# Patient Record
Sex: FEMALE | NOT HISPANIC OR LATINO | Employment: UNEMPLOYED | URBAN - METROPOLITAN AREA
[De-identification: names, ages, dates, MRNs, and addresses within clinical notes are randomized per-mention and may not be internally consistent; named-entity substitution may affect disease eponyms.]

---

## 2017-03-22 ENCOUNTER — TRANSCRIBE ORDERS (OUTPATIENT)
Dept: PHYSICAL THERAPY | Facility: HOSPITAL | Age: 47
End: 2017-03-22

## 2017-03-22 DIAGNOSIS — S81.802D OPEN WOUND OF LEFT LOWER EXTREMITY, SUBSEQUENT ENCOUNTER: Primary | ICD-10-CM

## 2017-03-29 ENCOUNTER — HOSPITAL ENCOUNTER (OUTPATIENT)
Dept: PHYSICAL THERAPY | Facility: HOSPITAL | Age: 47
Setting detail: THERAPIES SERIES
Discharge: HOME | End: 2017-03-29

## 2017-03-29 DIAGNOSIS — S81.802D TRAUMATIC OPEN WOUND OF LEFT LOWER LEG WITH DELAYED HEALING, SUBSEQUENT ENCOUNTER: Primary | ICD-10-CM

## 2017-03-29 PROCEDURE — 87070 CULTURE OTHR SPECIMN AEROBIC: CPT | Performed by: ORTHOPAEDIC SURGERY

## 2017-03-29 PROCEDURE — 87077 CULTURE AEROBIC IDENTIFY: CPT | Performed by: ORTHOPAEDIC SURGERY

## 2017-03-29 PROCEDURE — G8990 OTHER PT/OT CURRENT STATUS: HCPCS

## 2017-03-29 PROCEDURE — 87075 CULTR BACTERIA EXCEPT BLOOD: CPT | Performed by: ORTHOPAEDIC SURGERY

## 2017-03-29 PROCEDURE — 87205 SMEAR GRAM STAIN: CPT | Performed by: ORTHOPAEDIC SURGERY

## 2017-03-29 PROCEDURE — 87186 SC STD MICRODIL/AGAR DIL: CPT | Performed by: ORTHOPAEDIC SURGERY

## 2017-03-29 PROCEDURE — G8991 OTHER PT/OT GOAL STATUS: HCPCS

## 2017-03-29 PROCEDURE — 97162 PT EVAL MOD COMPLEX 30 MIN: CPT

## 2017-03-29 PROCEDURE — 97610 LOW FREQUENCY NON-THERMAL US: CPT

## 2017-03-29 PROCEDURE — 97597 DBRDMT OPN WND 1ST 20 CM/<: CPT

## 2017-03-29 NOTE — PROGRESS NOTES
Outpatient Rehabilitation - Wound/Debridement Initial Eval  [facility]     Patient Name: Sofi Paez  : 1970  MRN: [id number]  Today's Date: 3/29/2017            LLE wound:      Admit Date: 3/29/2017    Visit Dx:    ICD-10-CM ICD-9-CM   1. Traumatic open wound of left lower leg with delayed healing, subsequent encounter S81.802D V58.89     891.1       There is no problem list on file for this patient.       Past Medical History:   Diagnosis Date   • COPD (chronic obstructive pulmonary disease)    • High cholesterol    • Rheumatoid arthritis         Past Surgical History:   Procedure Laterality Date   • CHOLECYSTECTOMY     • HYSTERECTOMY     • SKIN GRAFT SPLIT THICKNESS Left 2017    Split-thickness grafting to LLE from L thigh; Ricardo John MD ([facility])   • WOUND DEBRIDEMENT Left 2017    exploration and debridement of leg laceration with surgical closure; per Ricardo John MD   • WOUND DEBRIDEMENT Left 2017    I&D of dehisced surgical wound and surgical closure; Ricardo John MD             Patient History       17 1030          History    Chief Complaint Ulcer, wound or other skin condition;Pain;Difficulty Walking;Difficulty with daily activities  -      Type of Pain Lower Extremity / Leg  -      Date Current Problem(s) Began 17  -      Brief Description of Current Complaint Patient fell on stairs and lacerated left shin.  Pt had surgical closure 17, I&D 17 after wound dehisced, and again 17.  Patient then had split-thickness skin graft 3/9/17 from L thigh that did not take.  Pt is now referred to PT wound care for treatment.  -      Previous treatment for THIS PROBLEM Surgery;Medication  -      Surgery Date: 17  -      Patient/Caregiver Goals Heal wound;Know what to do to help the symptoms;Return to prior level of function  -      Patient's Rating of General Health Good  -      Patient seeing anyone else  for problem(s)? No  -JM      How has patient tried to help current problem? Currently dressing wound daily with dry gauze, keeping dry in shower, elevating leg.  Not currently on antibitoics.  -      What clinical tests have you had for this problem? Other 1 (comment)   wound culture 2/3/17 in ED  -      Results of Clinical Tests (+) Klebsiella from ED culture 2/3/17, most recent cultures 2/23/17 no growth  -      Related/Recent Hospitalizations Yes  -      Date of Hospitalization 01/18/17  -      Surgery CPT Code 1 --   Exploration and debridment of LLE wound with closure  -      Surgery Date 1 01/18/17  -      Surgery CPT Code 2 --   I&D and closure of dehisced LLE wound  -      Surgery Date 2 02/23/17  -      Surgery CPT Code 3 --   Split-thickness skin graft to LLE wound, L thigh donor site  -      Surgery Date 3 03/09/17  -      Surgery/Hospitalization [facility], surgeon Ricardo John MD  -      Pain     Pain Location Leg  -      Pain at Present 5  -JM      What Performance Factors Make the Current Problem(s) WORSE? Standing, walking  -      What Performance Factors Make the Current Problem(s) BETTER? Rest, elevation of leg  -      Is your sleep disturbed? Yes  -      Difficulties with ADL's? Unable to go up/down stairs, difficulty with walking and standing, decreased strength/endurance since limiting weight bearing since January.  -      Fall Risk Assessment    Any falls in the past year: Yes  -JM      Number of falls reported in the last 12 months 2  -      Factors that contributed to the fall: Tripped  -      Daily Activities    Primary Language English  -      Are you able to read Yes  -JM      Are you able to write Yes  -      How does patient learn best? Listening;Demonstration  -      Teaching needs identified Management of Condition  -      Patient is concerned about/has problems with Climbing Stairs;Performing home management  (household chores, shopping, care of dependents);Performing job responsibilities/community activities (work, school,;Standing;Walking  -      Barriers to learning None  -JM      Action taken for identified issues Education provided to pt and family during initial evaluation and treatment.  -      Pt Participated in POC and Goals Yes  -        User Key  (r) = Recorded By, (t) = Taken By, (c) = Cosigned By    Initials Name Provider Type    ANNA Whaley, PT Physical Therapist          EVALUATION        PT Ortho       03/29/17 1030    Subjective Comments    Subjective Comments Pt states she put current dressing on last night.  Has been keeping her leg wrapped when showering but occasionally gets bandage wet.  Not using any wound , just changing to new dry gauze dressing per instructions.  Pt states she is currently not on any antibiotics but had Cipro and Bactrim in February.  -    Subjective Pain    Able to rate subjective pain? yes  -JM    Pre-Treatment Pain Level 4  -JM    Post-Treatment Pain Level 6  -JM    Transfers    Transfers, Sit-Stand [place] independent  -    Transfers, Stand-Sit [place] independent  -    Transfer, Comment Pt supine on stretcher for tx.  -    Gait Assessment/Treatment    Gait, [place] Level independent  -    Gait, Assistive Device standard walker  -    Gait, Gait Deviations left:;antalgic  -      User Key  (r) = Recorded By, (t) = Taken By, (c) = Cosigned By    Initials Name Provider Type    ANNA Whaley, PT Physical Therapist                LDA Wound       03/29/17 1030          Wound 03/29/17 1030 Left anterior leg laceration    Wound - Properties Group Date first assessed: 03/29/17  - Time first assessed: 1030  - Side: Left  - Orientation: anterior  - Location: leg  - Type: laceration  - Additional Comments: deep laceration to pre-tibial area 1/18/17  -    Wound WDL ex   min erythema/moderate edema  -      Dressing  "Appearance intact;moist drainage  -JM      Base moist;pink;granulating;maroon;purple;reddened;yellow;slough   fascia with a few sutures visible  -      Periwound Area swelling;intact;pink;other (see comments)   hyperpigmented/brawny, thickened edges  -JM      Edges open;rolled   edge attached 4-6:00, loose around remainder of wound  -JM      Length (cm) 6.5  -JM      Width (cm) 5.5  -JM      Depth (cm) 1  -JM      Undermining [depth (cm)/Location] 2cm/11:00-2:00  -JM      Drainage Characteristics/Odor serosanguineous;no odor;specimen obtained and sent to lab  -      Drainage Amount moderate  -JM      Picture taken yes  -      Wound Cleaning cleansed with;other (see comments)   Phase one wound cleanser  -      Wound Interventions debrided;other (see comments)   MIST 12 minutes  -      Dressing Dressing applied;low-adherent   6\" optifoam gentle, size 5 compressogrip  -      Packing other (see comments)   aquacel ag ribbon  -      Periwound Care cleansed with pH balanced cleanser;dry periwound area maintained  -        User Key  (r) = Recorded By, (t) = Taken By, (c) = Cosigned By    Initials Name Provider Type    ANNA Whaley, PT Physical Therapist            WOUND DEBRIDEMENT  Debridement Site 1  Location- Site 1: LLE wound  Selective Debridement- Site 1: Wound Surface <20cmsq (approx 6cm area debrided)  Instruments- Site 1: tweezers  Excised Tissue Description- Site 1: minimum, slough  Bleeding- Site 1: none                 Recommendation and Plan        PT Assessment/Plan       03/29/17 1030       PT Assessment    Functional Limitations Impaired gait;Limitation in home management;Limitations in community activities;Performance in leisure activities;Performance in self-care ADL  -     Impairments Integumentary integrity;Edema;Pain  -     Assessment Comments Pt with chronic non-healing wound of LLE s/p laceration with multiple I&Ds and failed skin grafting.  Wound complicated by patient " h/o RA on chronic Prednisone and current 1/2-pack/day smoker.  Wound base with exposed fascia and palpable bone, a few sutures still visible.  Patient will benefit from skilled PT for selective debridement of non-viable tissues, MIST ultrasound to stimulate increased blood flow and granulation of wound, and advanced dressing management.  Patient may benefit from wound vac to assist with wound closure.    -     Rehab Potential Fair  -     Patient/caregiver participated in establishment of treatment plan and goals Yes  -     Patient would benefit from skilled therapy intervention Yes  -     PT Plan    PT Frequency 3x/week  -     Predicted Duration of Therapy Intervention (days/wks) 12 weeks  -     Planned CPT's? PT EVAL MOD COMPLELITY: 06983;PT RUBEN DEBRIDE OPEN WOUND UP TO 20 CM: 93419;PT RUBEN DEBRIDE OPEN WOUND EA ADD 20 CM: 08839;PT NLFU MIST: 63590;PT NEG PRESS WOUND TO 50 SQCM 3: 48011;PT THER PROC EA 15 MIN: 12268;PT THER SUPP EA 15 MIN  -     Physical Therapy Interventions (Optional Details) patient/family education;wound care  -     PT Plan Comments MIST and debridement 3x/week to initiate, then may consider wound vac, wound cultures sent to lab this tx  -       User Key  (r) = Recorded By, (t) = Taken By, (c) = Cosigned By    Initials Name Provider Type    ANNA Whaley, PT Physical Therapist                PT OP Goals       03/29/17 1030       PT Short Term Goals    STG 1 Patient and/ or caregiver able to verbalize signs and symptoms of infection.  -     STG 2 Decrease wound dimensions by 25% as evidence of wound closure.  -     STG 3 Increase beefy red granulation of wound to 50% of wound base.  -     Long Term Goals    LTG 1 Patient and/ or caregiver independent with clean dressing changes.  -     LTG 2 Decrease wound dimensions by 75% as evidence of wound closure.  -     LTG 3 Wound base 100% granulation to promote re-epithelialization of wound.  -     Time Calculation     PT Goal Re-Cert Due Date 04/29/17  -ANNA       User Key  (r) = Recorded By, (t) = Taken By, (c) = Cosigned By    Initials Name Provider Type    ANNA Whaley, PT Physical Therapist          Time Calculation: Start Time: 1030    Therapy Charges for Today     Code Description Service Date Service Provider Modifiers Qty    03112754571 HC PT OTHER PRIME FUNCT CURRENT 3/29/2017 Lakeshia Whaley, PT GP, CL 1    86543975321 HC PT OTHER PRIME FUNCT PROJECTED 3/29/2017 Lakeshia Whaley, PT GP, CI 1    76354765909 HC PT EVAL MOD COMPLEXITY 4 3/29/2017 Lakeshia Whaley, PT GP 1    06381352300 HC PT NLFU MIST 3/29/2017 Lakeshia Whaley, PT GP 1    60661533205 HC RUBEN DEBRIDE OPEN WOUND UP TO 20CM 3/29/2017 Lakeshia Whaley, PT 60, GP 1    44871687303 HC PT THER SUPP EA 15 MIN 3/29/2017 Lakeshia Whaley, PT GP 1          PT G-Codes  Outcome Measure Options: BWAT ([facility] Wound Assess Tool), Lower Extremity Functional Scale (LEFS)  Score: 75% limitation  Functional Limitation: Other PT primary  Other PT Primary Current Status (): At least 60 percent but less than 80 percent impaired, limited or restricted  Other PT Primary Goal Status (): At least 1 percent but less than 20 percent impaired, limited or restricted     Lakeshia Whaley, PT  3/29/2017

## 2017-04-01 ENCOUNTER — HOSPITAL ENCOUNTER (OUTPATIENT)
Dept: PHYSICAL THERAPY | Facility: HOSPITAL | Age: 47
Setting detail: THERAPIES SERIES
Discharge: HOME | End: 2017-04-01

## 2017-04-01 DIAGNOSIS — S81.802D TRAUMATIC OPEN WOUND OF LEFT LOWER LEG WITH DELAYED HEALING, SUBSEQUENT ENCOUNTER: Primary | ICD-10-CM

## 2017-04-01 LAB
BACTERIA SPEC AEROBE CULT: ABNORMAL
GRAM STN SPEC: ABNORMAL
GRAM STN SPEC: ABNORMAL

## 2017-04-01 PROCEDURE — 97610 LOW FREQUENCY NON-THERMAL US: CPT

## 2017-04-01 PROCEDURE — 97597 DBRDMT OPN WND 1ST 20 CM/<: CPT

## 2017-04-01 NOTE — PROGRESS NOTES
Outpatient Rehabilitation - Wound/Debridement Treatment Note  [facility]     Patient Name: Sofi Paez  : 1970  MRN: [id number]  Today's Date: 2017                Admit Date: 2017    Visit Dx:    ICD-10-CM ICD-9-CM   1. Traumatic open wound of left lower leg with delayed healing, subsequent encounter S81.802D V58.89     891.1       There is no problem list on file for this patient.       Past Medical History:   Diagnosis Date   • COPD (chronic obstructive pulmonary disease)    • High cholesterol    • Rheumatoid arthritis         Past Surgical History:   Procedure Laterality Date   • CHOLECYSTECTOMY     • HYSTERECTOMY     • SKIN GRAFT SPLIT THICKNESS Left 2017    Split-thickness grafting to LLE from L thigh; Ricardo John MD ([facility])   • WOUND DEBRIDEMENT Left 2017    exploration and debridement of leg laceration with surgical closure; per Ricardo John MD   • WOUND DEBRIDEMENT Left 2017    I&D of dehisced surgical wound and surgical closure; Ricardo John MD         EVALUATION        PT Ortho       17 1240    Subjective Comments    Subjective Comments Pt states she did not have to change the dressing, did not attempt to shower due to fear of getting the bandage wet.  -    Subjective Pain    Able to rate subjective pain? yes  -    Pre-Treatment Pain Level 4  -    Post-Treatment Pain Level 6  -    Subjective Pain Comment Pt states her leg hurts the worst at night.  -    Transfers    Transfers, Sit-Stand [place] independent  -    Transfers, Stand-Sit [place] independent  -    Transfer, Comment Pt reclined on stretcher for tx.  -    Gait Assessment/Treatment    Gait, [place] Level independent  -      User Key  (r) = Recorded By, (t) = Taken By, (c) = Cosigned By    Initials Name Provider Type    ANNA Whaley, PT Physical Therapist                    [facility] Wound       17 1240          Wound 17  "1030 Left anterior leg laceration    Wound - Properties Group Date first assessed: 03/29/17  - Time first assessed: 1030  - Side: Left  - Orientation: anterior  - Location: leg  - Type: laceration  - Additional Comments: deep laceration to pre-tibial area 1/18/17  -    Wound WDL ex   min erythema/moderate edema  -      Dressing Appearance intact;moist drainage  -      Base moist;pink;granulating;purple;reddened;yellow;slough   fascia with a few sutures visible  -      Periwound Area swelling;intact;pink;other (see comments)   hyperpigmented/brawny, thickened edges  -      Edges open;rolled   edge attached 4-7:00, loose around remainder of wound  -      Drainage Characteristics/Odor serosanguineous;no odor;yellow  -JM      Drainage Amount large  -      Wound Cleaning cleansed with;other (see comments)   Phase One  -      Wound Interventions debrided;other (see comments)   MIST 12 minutes  -      Dressing Dressing applied;foam;silver impregnated dressing   mepilex ag foam, 6\" opti gentle, hypafix, size 5 compressogrip -      Packing other (see comments)   aquacel AG ribbon cut to 1/2 width  -      Periwound Care cleansed with pH balanced cleanser;dry periwound area maintained  -        User Key  (r) = Recorded By, (t) = Taken By, (c) = Cosigned By    Initials Name Provider Type    ANNA Whaley, PT Physical Therapist            WOUND DEBRIDEMENT  Debridement Site 1  Location- Site 1: LLE wound  Selective Debridement- Site 1: Wound Surface <20cmsq (approx 6cm area debrided)  Instruments- Site 1: tweezers  Excised Tissue Description- Site 1: minimum, slough  Bleeding- Site 1: seeping             Recommendation and Plan        PT Assessment/Plan       04/01/17 1240       PT Assessment    Functional Limitations Impaired gait;Limitation in home management;Limitations in community activities;Performance in leisure activities;Performance in self-care ADL  -     Impairments " Integumentary integrity;Edema;Pain  -     Assessment Comments Wound with large yellow drainage on previous dressing today.  PT added Ag foam to assist with exudate management.  Wound base granulating, with min slough buildup requiring debridement.  Periwound edges with less purple/brawny color today, and granulation with increase in beefy red color after MIST tx today.  Wound cultures still pending.  -     Rehab Potential Fair  -     Patient/caregiver participated in establishment of treatment plan and goals Yes  -     Patient would benefit from skilled therapy intervention Yes  -     PT Plan    PT Frequency 3x/week  -     Physical Therapy Interventions (Optional Details) patient/family education;wound care  -     PT Plan Comments MIST, debridement, ? VAC  -       User Key  (r) = Recorded By, (t) = Taken By, (c) = Cosigned By    Initials Name Provider Type    ANNA Whaley, PT Physical Therapist          Goals        PT OP Goals       04/01/17 1240       Time Calculation    PT Goal Re-Cert Due Date 04/29/17  -       User Key  (r) = Recorded By, (t) = Taken By, (c) = Cosigned By    Initials Name Provider Type    ANNA Whaley, PT Physical Therapist          PT Goal Re-Cert Due Date: 04/29/17            Time Calculation: Start Time: 1240    Therapy Charges for Today     Code Description Service Date Service Provider Modifiers Qty    69956535066 HC PT NLFU MIST 4/1/2017 Lakeshia Whaley, PT GP 1    [id number] HC RUBEN DEBRIDE OPEN WOUND UP TO 20CM 4/1/2017 Lakeshia Whaley, PT 60, GP 1                Lakeshia Whaley, PT  4/1/2017

## 2017-04-04 ENCOUNTER — HOSPITAL ENCOUNTER (OUTPATIENT)
Dept: PHYSICAL THERAPY | Facility: HOSPITAL | Age: 47
Setting detail: THERAPIES SERIES
Discharge: HOME | End: 2017-04-04

## 2017-04-04 ENCOUNTER — TELEPHONE (OUTPATIENT)
Dept: PHYSICAL THERAPY | Facility: HOSPITAL | Age: 47
End: 2017-04-04

## 2017-04-04 DIAGNOSIS — S81.802D TRAUMATIC OPEN WOUND OF LEFT LOWER LEG WITH DELAYED HEALING, SUBSEQUENT ENCOUNTER: Primary | ICD-10-CM

## 2017-04-04 PROCEDURE — 97597 DBRDMT OPN WND 1ST 20 CM/<: CPT

## 2017-04-04 PROCEDURE — 97610 LOW FREQUENCY NON-THERMAL US: CPT

## 2017-04-04 NOTE — PROGRESS NOTES
Outpatient Rehabilitation - Wound/Debridement Treatment Note  [facility]     Patient Name: Sofi Paez  : 1970  MRN: [id number]  Today's Date: 2017            LLE:      Admit Date: 2017    Visit Dx:    ICD-10-CM ICD-9-CM   1. Traumatic open wound of left lower leg with delayed healing, subsequent encounter S81.802D V58.89     891.1       There is no problem list on file for this patient.       Past Medical History:   Diagnosis Date   • COPD (chronic obstructive pulmonary disease)    • High cholesterol    • Rheumatoid arthritis         Past Surgical History:   Procedure Laterality Date   • CHOLECYSTECTOMY     • HYSTERECTOMY     • SKIN GRAFT SPLIT THICKNESS Left 2017    Split-thickness grafting to LLE from L thigh; Ricardo John MD ([facility])   • WOUND DEBRIDEMENT Left 2017    exploration and debridement of leg laceration with surgical closure; per Ricardo John MD   • WOUND DEBRIDEMENT Left 2017    I&D of dehisced surgical wound and surgical closure; Ricardo John MD         EVALUATION        PT Ortho       17 1000    Subjective Comments    Subjective Comments Pt states her leg feels a little better today, but the additional silver foam under the dressing caused a little more discomfort at night.  -    Subjective Pain    Able to rate subjective pain? yes  -    Pre-Treatment Pain Level 4  -    Post-Treatment Pain Level 5  -    Transfers    Transfers, Sit-Stand [place] independent  -    Transfers, Stand-Sit [place] independent  -    Transfer, Comment Long sitting on stretcher for tx.  -    Gait Assessment/Treatment    Gait, [place] Level independent  -      17 1240    Subjective Comments    Subjective Comments Pt states she did not have to change the dressing, did not attempt to shower due to fear of getting the bandage wet.  -    Subjective Pain    Able to rate subjective pain? yes  -    Pre-Treatment Pain  "Level 4  -JM    Post-Treatment Pain Level 6  -JM    Subjective Pain Comment Pt states her leg hurts the worst at night.  -JM    Transfers    Transfers, Sit-Stand [place] independent  -JM    Transfers, Stand-Sit [place] independent  -JM    Transfer, Comment Pt reclined on stretcher for tx.  -JM    Gait Assessment/Treatment    Gait, [place] Level independent  -      User Key  (r) = Recorded By, (t) = Taken By, (c) = Cosigned By    Initials Name Provider Type     Lakeshia Whaley, PT Physical Therapist                    LDA Wound       04/04/17 1000          Wound 03/29/17 1030 Left anterior leg laceration    Wound - Properties Group Date first assessed: 03/29/17  - Time first assessed: 1030  -JM Side: Left  - Orientation: anterior  - Location: leg  -JM Type: laceration  - Additional Comments: deep laceration to pre-tibial area 1/18/17  -    Wound WDL ex   min erythema/moderate edema  -      Dressing Appearance intact;moist drainage  -      Base moist;pink;granulating;reddened;yellow;slough   fascia with a few sutures visible  -      Periwound Area swelling;intact;pink;other (see comments);macerated   hyperpigmented/brawny, thickened edges  -      Edges open;rolled   edge attached 4-7:00, loose around remainder of wound  -JM      Length (cm) 5.4  -JM      Width (cm) 7  -JM      Drainage Characteristics/Odor serosanguineous;no odor;yellow  -JM      Drainage Amount large  -JM      Picture taken yes  -      Wound Cleaning cleansed with;other (see comments)   phase one  -      Wound Interventions debrided;other (see comments)   MIST 15 min  -      Dressing Dressing changed;low-adherent   6\" optifoam gentle, size 5 compressogrip  -      Packing other (see comments)   saline-moistened hydrofera blue  -      Periwound Care cleansed with pH balanced cleanser;dry periwound area maintained  -        User Key  (r) = Recorded By, (t) = Taken By, (c) = Cosigned By    Initials Name " Provider Type    ANNA Whaley, PT Physical Therapist            WOUND DEBRIDEMENT  Debridement Site 1  Location- Site 1: LLE wound  Selective Debridement- Site 1: Wound Surface <20cmsq (approx 6cm area debrided)  Instruments- Site 1: tweezers  Excised Tissue Description- Site 1: moderate, slough, other (comment) (biofilm)  Bleeding- Site 1: seeping             Recommendation and Plan        PT Assessment/Plan       04/04/17 1000       PT Assessment    Functional Limitations Impaired gait;Limitation in home management;Limitations in community activities;Performance in leisure activities;Performance in self-care ADL  -     Impairments Integumentary integrity;Edema;Pain  -     Assessment Comments LLE wound with increase in beefy red color, no purple discoloration noted today, edges less purple.  Min maceration from aquacel packing.  PT changed to hydrofera blue to better manage exudate and reduce biofilm buildup.  Pt's preliminary wounds cultures both abnormal.  Will contact pt's PCP re: wound cultures and ordering of wound vac.  Pt may benefit from referral to [facility].  -     Rehab Potential Good  -     Patient/caregiver participated in establishment of treatment plan and goals Yes  -     Patient would benefit from skilled therapy intervention Yes  -     PT Plan    PT Frequency 3x/week  -     Physical Therapy Interventions (Optional Details) wound care;patient/family education;bandaging  -     PT Plan Comments MIST, debridement  -       User Key  (r) = Recorded By, (t) = Taken By, (c) = Cosigned By    Initials Name Provider Type    ANNA Whaley, PT Physical Therapist          Goals        PT OP Goals       04/04/17 1000       Time Calculation    PT Goal Re-Cert Due Date 04/29/17  -       User Key  (r) = Recorded By, (t) = Taken By, (c) = Cosigned By    Initials Name Provider Type    ANNA Whaley, PT Physical Therapist          PT Goal Re-Cert Due Date: 04/29/17            Time  Calculation: Start Time: 1000    Therapy Charges for Today     Code Description Service Date Service Provider Modifiers Qty    72058497471  PT NLFU MIST 4/4/2017 Lakeshia Whaley, PT GP 1    [id number]  PT THER SUPP EA 15 MIN 4/4/2017 Lakeshia Whaley, PT GP 1    [id number]  RUBEN DEBRIDE OPEN WOUND UP TO 20CM 4/4/2017 Lakeshia Whaley, PT 60, GP 1                Lakeshia Whaley, PT  4/4/2017

## 2017-04-04 NOTE — TELEPHONE ENCOUNTER
"PT called patient's PCP, Venu Sosa MD   Phone: [phone]  Fax: [phone]    Notified MD that patient's surgeon (Ricardo John MD) has signed off on patient (per PT phone call with Dr. John's assistant, Bhavna), and PT needs MD willing to follow patient for her plan of care and any other needs/concerns.  Dr. Sosa states he is willing to sign off on the therapy Plan of Care.  PT also stated patient will benefit from wound vac, and MD is willing to sign wound vac order.  PT will initiate wound vac order.    PT also contacted Dr. John's assistant, Bhavna, to notify surgeon that wound care will be starting a wound VAC for patient's wound.  PT was told \"He wants you to do whatever you think is necessary.\"  Lakeshia Whaley, PT 4/4/2017 3:56 PM      "

## 2017-04-05 LAB
BACTERIA SPEC ANAEROBE CULT: ABNORMAL
BACTERIA SPEC ANAEROBE CULT: ABNORMAL

## 2017-04-06 ENCOUNTER — HOSPITAL ENCOUNTER (OUTPATIENT)
Dept: PHYSICAL THERAPY | Facility: HOSPITAL | Age: 47
Setting detail: THERAPIES SERIES
Discharge: HOME | End: 2017-04-06

## 2017-04-06 DIAGNOSIS — S81.802D TRAUMATIC OPEN WOUND OF LEFT LOWER LEG WITH DELAYED HEALING, SUBSEQUENT ENCOUNTER: Primary | ICD-10-CM

## 2017-04-06 PROCEDURE — 97610 LOW FREQUENCY NON-THERMAL US: CPT

## 2017-04-06 PROCEDURE — 97597 DBRDMT OPN WND 1ST 20 CM/<: CPT

## 2017-04-06 NOTE — PROGRESS NOTES
"    Outpatient Rehabilitation - Wound/Debridement Treatment Note  [facility]     Patient Name: Sofi Paez  : 1970  MRN: [id number]  Today's Date: 2017                Admit Date: 2017    Visit Dx:    ICD-10-CM ICD-9-CM   1. Traumatic open wound of left lower leg with delayed healing, subsequent encounter S81.802D V58.89     891.1       There is no problem list on file for this patient.       Past Medical History:   Diagnosis Date   • COPD (chronic obstructive pulmonary disease)    • High cholesterol    • Rheumatoid arthritis         Past Surgical History:   Procedure Laterality Date   • CHOLECYSTECTOMY     • HYSTERECTOMY     • SKIN GRAFT SPLIT THICKNESS Left 2017    Split-thickness grafting to LLE from L thigh; Ricardo John MD ([facility])   • WOUND DEBRIDEMENT Left 2017    exploration and debridement of leg laceration with surgical closure; per Ricardo John MD   • WOUND DEBRIDEMENT Left 2017    I&D of dehisced surgical wound and surgical closure; Ricardo John MD         EVALUATION        PT Ortho       17 1100    Subjective Comments    Subjective Comments Pt c/o fatigue.  Reports her leg feels a little better today.  \"It hurts for a couple hours after I come here.\"  -    Subjective Pain    Able to rate subjective pain? yes  -    Pre-Treatment Pain Level 0  -    Post-Treatment Pain Level 4  -    Transfers    Transfers, Sit-Stand [place] independent  -    Transfers, Stand-Sit [place] independent  -    Transfer, Comment Long sitting on stretcher for tx.  -    Gait Assessment/Treatment    Gait, [place] Level independent  -      17 1000    Subjective Comments    Subjective Comments Pt states her leg feels a little better today, but the additional silver foam under the dressing caused a little more discomfort at night.  -    Subjective Pain    Able to rate subjective pain? yes  -    Pre-Treatment Pain Level 4  " "-JM    Post-Treatment Pain Level 5  -JM    Transfers    Transfers, Sit-Stand [place] independent  -    Transfers, Stand-Sit [place] independent  -    Transfer, Comment Long sitting on stretcher for tx.  -JM    Gait Assessment/Treatment    Gait, [place] Level independent  -      User Key  (r) = Recorded By, (t) = Taken By, (c) = Cosigned By    Initials Name Provider Type    ANNA Whaley, PT Physical Therapist                    LDA Wound       04/06/17 1100          Wound 03/29/17 1030 Left anterior leg laceration    Wound - Properties Group Date first assessed: 03/29/17  - Time first assessed: 1030  -JM Side: Left  - Orientation: anterior  - Location: leg  - Type: laceration  - Additional Comments: deep laceration to pre-tibial area 1/18/17  -    Wound WDL ex   min periwound edema  -      Dressing Appearance intact;moist drainage  -      Base moist;pink;granulating;reddened;yellow;slough   fascia with a few sutures visible  -      Periwound Area swelling;intact;pink;other (see comments);macerated   hyperpigmented/brawny, thickened edges  -      Edges open;rolled   edge attached 4-7:00, loose around remainder of wound  -      Drainage Characteristics/Odor serosanguineous;no odor;yellow  -JM      Drainage Amount large  -      Wound Cleaning cleansed with;other (see comments)   Phase one  -      Wound Interventions debrided;other (see comments)   MIST 15 min  -      Dressing Dressing applied;low-adherent   6\" optifoam, hypafix tape, size 5 compressogrip  -      Packing other (see comments)   saline-moistened hydrofera blue  -      Periwound Care cleansed with pH balanced cleanser;dry periwound area maintained  -        User Key  (r) = Recorded By, (t) = Taken By, (c) = Cosigned By    Initials Name Provider Type    ANNA Whaley, PT Physical Therapist            WOUND DEBRIDEMENT  Debridement Site 1  Location- Site 1: LLE wound  Selective Debridement- " Site 1: Wound Surface <20cmsq (approx 6cm area debrided)  Instruments- Site 1: tweezers  Excised Tissue Description- Site 1: minimum, slough  Bleeding- Site 1: seeping             Recommendation and Plan        PT Assessment/Plan       04/06/17 1100       PT Assessment    Functional Limitations Impaired gait;Limitation in home management;Limitations in community activities;Performance in leisure activities;Performance in self-care ADL  -     Impairments Integumentary integrity;Edema;Pain  -     Assessment Comments LLE wound with continued improvement in color of granulation tissue (increase in beefy red color), also less purple/red discoloration of edges.  Less slough buildup noted today with use of hydrofera blue and less maceration of edges.  Pt will continue to benefit from selective debridement and MIST to promote granulation of wound bed.  PT received signed MD order for wound vac and will initiate ordering process, likely to be initiated next week.  -     Rehab Potential Good  -     Patient/caregiver participated in establishment of treatment plan and goals Yes  -     Patient would benefit from skilled therapy intervention Yes  -     PT Plan    PT Frequency 3x/week  -     Physical Therapy Interventions (Optional Details) patient/family education;wound care  -     PT Plan Comments PT will initiate wound vac order  -       User Key  (r) = Recorded By, (t) = Taken By, (c) = Cosigned By    Initials Name Provider Type    ANNA Whaley PT Physical Therapist          Goals        PT OP Goals       04/06/17 1100       Time Calculation    PT Goal Re-Cert Due Date 04/29/17  -       User Key  (r) = Recorded By, (t) = Taken By, (c) = Cosigned By    Initials Name Provider Type    ANNA Whaley PT Physical Therapist          PT Goal Re-Cert Due Date: 04/29/17            Time Calculation: Start Time: 1100    Therapy Charges for Today     Code Description Service Date Service Provider  Modifiers Qty    05826692668 HC PT NLFU MIST 4/6/2017 Lakeshia Whaley, PT GP 1    36828207108 HC RUBEN DEBRIDE OPEN WOUND UP TO 20CM 4/6/2017 Lakeshia Whaley, PT 60, GP 1    [id number] HC PT THER SUPP EA 15 MIN 4/6/2017 Lakeshia Whaley, PT GP 1                Lakeshia Whaley, PT  4/6/2017

## 2017-04-08 ENCOUNTER — HOSPITAL ENCOUNTER (OUTPATIENT)
Dept: PHYSICAL THERAPY | Facility: HOSPITAL | Age: 47
Setting detail: THERAPIES SERIES
Discharge: HOME | End: 2017-04-08

## 2017-04-08 DIAGNOSIS — S81.802D TRAUMATIC OPEN WOUND OF LEFT LOWER LEG WITH DELAYED HEALING, SUBSEQUENT ENCOUNTER: Primary | ICD-10-CM

## 2017-04-08 PROCEDURE — 97605 NEG PRS WND THER DME<=50SQCM: CPT

## 2017-04-08 NOTE — PROGRESS NOTES
"    Outpatient Rehabilitation - Wound/Debridement Treatment Note  [facility]     Patient Name: Sofi Paez  : 1970  MRN: [id number]  Today's Date: 2017                Admit Date: 2017    Visit Dx:    ICD-10-CM ICD-9-CM   1. Traumatic open wound of left lower leg with delayed healing, subsequent encounter S81.802D V58.89     891.1       There is no problem list on file for this patient.       Past Medical History:   Diagnosis Date   • COPD (chronic obstructive pulmonary disease)    • High cholesterol    • Rheumatoid arthritis         Past Surgical History:   Procedure Laterality Date   • CHOLECYSTECTOMY     • HYSTERECTOMY     • SKIN GRAFT SPLIT THICKNESS Left 2017    Split-thickness grafting to LLE from L thigh; Ricardo John MD ([facility])   • WOUND DEBRIDEMENT Left 2017    exploration and debridement of leg laceration with surgical closure; per Ricardo John MD   • WOUND DEBRIDEMENT Left 2017    I&D of dehisced surgical wound and surgical closure; Ricardo John MD         EVALUATION        PT Ortho       17 1300    Subjective Comments    Subjective Comments Pt stated her leg was sore today.   -    Subjective Pain    Able to rate subjective pain? yes  -    Pre-Treatment Pain Level 3  -MF    Post-Treatment Pain Level 5  -MF    Transfers    Transfers, Sit-Stand [place] independent  -MF    Transfers, Stand-Sit [place] independent  -MF    Transfer, Comment long sitting on stretcher for tx.   -    Gait Assessment/Treatment    Gait, [place] Level independent  -      17 1100    Subjective Comments    Subjective Comments Pt c/o fatigue.  Reports her leg feels a little better today.  \"It hurts for a couple hours after I come here.\"  -    Subjective Pain    Able to rate subjective pain? yes  -    Pre-Treatment Pain Level 0  -    Post-Treatment Pain Level 4  -    Transfers    Transfers, Sit-Stand [place] independent  " -JM    Transfers, Stand-Sit [place] independent  -    Transfer, Comment Long sitting on stretcher for tx.  -    Gait Assessment/Treatment    Gait, [place] Level independent  -      User Key  (r) = Recorded By, (t) = Taken By, (c) = Cosigned By    Initials Name Provider Type    MILA Murphy, PT Physical Therapist    ANNA Whaley, PT Physical Therapist                    LDA Wound       04/08/17 1300          Wound 03/29/17 1030 Left anterior leg laceration    Wound - Properties Group Date first assessed: 03/29/17  - Time first assessed: 1030  -JM Side: Left  -JM Orientation: anterior  - Location: leg  -JM Type: laceration  - Additional Comments: deep laceration to pre-tibial area 1/18/17  -    Wound WDL ex  -MF      Dressing Appearance intact;moist drainage  -MF      Base moist;pink;granulating;reddened;yellow;slough  -MF      Periwound Area swelling;intact;pink;other (see comments)  -MF      Edges open;rolled  -MF      Drainage Characteristics/Odor serosanguineous;no odor;yellow  -MF      Drainage Amount moderate  -MF      Wound Cleaning irrigated with;sterile normal saline;other (see comments)   phase one  -MF      Therapy Setting (Negative Pressure Wound Therapy) continuous therapy  -MF      Pressure Setting (Negative Pressure Wound Therapy) 150 mmHg  -MF      Dressing (Negative Pressure Wound Therapy) foam, black;foam, white  -MF      Sponges Inserted (Negative Pressure Wound Therapy) 2   1 white, 1 black  -MF      Sponges Removed (Negative Pressure Wound Therapy) other (see comments)   hydrofera blue packing removed.  -        User Key  (r) = Recorded By, (t) = Taken By, (c) = Cosigned By    Initials Name Provider Type    MILA Murphy, PT Physical Therapist    ANNA Whaley, PT Physical Therapist          Finger sweep and visual inspection performed. Empty wound bed confirmed.  External label applied and verified.      Recommendation and Plan        PT  Assessment/Plan       04/08/17 1300       PT Assessment    Functional Limitations Impaired gait;Limitation in home management;Limitations in community activities;Performance in leisure activities;Performance in self-care ADL  -     Assessment Comments LLE wound cont to have increased granulation, but with moderate exudate and some periwound masceration.  PT applied wound vac today to help manage exudate and improve healing potential. White foam applied to wound base to limit pain with removal of wound vac, but may d/c if wound does not cont to granulate well.   -     Rehab Potential Good  -     Patient/caregiver participated in establishment of treatment plan and goals Yes  -     Patient would benefit from skilled therapy intervention Yes  -     PT Plan    PT Frequency 3x/week  -     Physical Therapy Interventions (Optional Details) wound care;patient/family education  -     PT Plan Comments cont now with wound vac 2-3 x/week.   -       User Key  (r) = Recorded By, (t) = Taken By, (c) = Cosigned By    Initials Name Provider Type     Israel Murphy, PT Physical Therapist          Goals        PT OP Goals       04/08/17 1300       Time Calculation    PT Goal Re-Cert Due Date 04/29/17  -       User Key  (r) = Recorded By, (t) = Taken By, (c) = Cosigned By    Initials Name Provider Type     Israel Murphy, PT Physical Therapist          PT Goal Re-Cert Due Date: 04/29/17            Time Calculation: Start Time: 1300  Total Timed Code Minutes- PT: 35 minute(s)    Therapy Charges for Today     Code Description Service Date Service Provider Modifiers Qty    03990169778 HC PT NEG PRESS WOUND TO 50SQCM DME1 4/8/2017 Israel Murphy, PT  1    [id number]  PT THER SUPP EA 15 MIN 4/8/2017 Israel Murphy, PT GP 1                Israel Murphy, PT  4/8/2017

## 2017-04-11 ENCOUNTER — HOSPITAL ENCOUNTER (OUTPATIENT)
Dept: PHYSICAL THERAPY | Facility: HOSPITAL | Age: 47
Setting detail: THERAPIES SERIES
Discharge: HOME | End: 2017-04-11

## 2017-04-11 DIAGNOSIS — S81.802D TRAUMATIC OPEN WOUND OF LEFT LOWER LEG WITH DELAYED HEALING, SUBSEQUENT ENCOUNTER: Primary | ICD-10-CM

## 2017-04-11 PROCEDURE — 97597 DBRDMT OPN WND 1ST 20 CM/<: CPT

## 2017-04-11 PROCEDURE — 97605 NEG PRS WND THER DME<=50SQCM: CPT

## 2017-04-11 NOTE — PROGRESS NOTES
Outpatient Rehabilitation - Wound/Debridement Treatment Note  [facility]     Patient Name: Sofi Paez  : 1970  MRN: [id number]  Today's Date: 2017            LLE:      Admit Date: 2017    Visit Dx:    ICD-10-CM ICD-9-CM   1. Traumatic open wound of left lower leg with delayed healing, subsequent encounter S81.802D V58.89     891.1       There is no problem list on file for this patient.       Past Medical History:   Diagnosis Date   • COPD (chronic obstructive pulmonary disease)    • High cholesterol    • Rheumatoid arthritis         Past Surgical History:   Procedure Laterality Date   • CHOLECYSTECTOMY     • HYSTERECTOMY     • SKIN GRAFT SPLIT THICKNESS Left 2017    Split-thickness grafting to LLE from L thigh; Ricardo John MD ([facility])   • WOUND DEBRIDEMENT Left 2017    exploration and debridement of leg laceration with surgical closure; per Ricardo John MD   • WOUND DEBRIDEMENT Left 2017    I&D of dehisced surgical wound and surgical closure; Ricardo John MD         EVALUATION        PT Ortho       17 1000    Subjective Comments    Subjective Comments Pt states her leg hurt most of the time with the wound vac on.  Accidentally caught the tubing and disconnected it this morning when waking up.  Pt had left vac dressing in place and covered with an optifoam border dressing until she could get to her appt.  States she has not been wearing compressogrip due to difficulty getting it on while vac in place.  -    Subjective Pain    Able to rate subjective pain? yes  -    Pre-Treatment Pain Level 3  -    Post-Treatment Pain Level 6  -    Transfers    Transfers, Sit-Stand [place] independent  -    Transfers, Stand-Sit [place] independent  -    Transfer, Comment long sitting on stretcher for tx.  -    Gait Assessment/Treatment    Gait, [place] Level independent  -      17 1300    Subjective Comments     Subjective Comments Pt stated her leg was sore today.   -MF    Subjective Pain    Able to rate subjective pain? yes  -MF    Pre-Treatment Pain Level 3  -MF    Post-Treatment Pain Level 5  -MF    Transfers    Transfers, Sit-Stand [place] independent  -MF    Transfers, Stand-Sit [place] independent  -MF    Transfer, Comment long sitting on stretcher for tx.   -MF    Gait Assessment/Treatment    Gait, [place] Level independent  -      User Key  (r) = Recorded By, (t) = Taken By, (c) = Cosigned By    Initials Name Provider Type     Israel Murphy, PT Physical Therapist     Lakeshia Whaley, PT Physical Therapist                    [facility] Wound       04/11/17 1000          Wound 03/29/17 1030 Left anterior leg laceration    Wound - Properties Group Date first assessed: 03/29/17  - Time first assessed: 1030  - Side: Left  - Orientation: anterior  - Location: leg  - Type: laceration  - Additional Comments: deep laceration to pre-tibial area 1/18/17  -    Wound WDL ex   moderate edema, min erythema  -      Dressing Appearance intact;moist drainage  -      Base moist;pink;granulating;reddened;yellow;slough   fascia and sutures still visible in base but granulating  -      Periwound Area swelling;intact;pink;other (see comments)   brawny thickened edges  -      Edges open;rolled   attached 4:00-7:00, loose around remainder  -      Drainage Characteristics/Odor serosanguineous;no odor;yellow  -      Drainage Amount moderate  -      Picture taken yes  -      Wound Cleaning cleansed with;other (see comments)   phase one  -      Wound Interventions debrided;other (see comments)   NPWT  -      Dressing Dressing changed;other (see comments)   VAC, size 5 compresogrip to LLE  -      Periwound Care cleansed with pH balanced cleanser;dry periwound area maintained;barrier film applied   no-sting and drape to border  -      Therapy Setting (Negative Pressure Wound Therapy)  continuous therapy  -      Pressure Setting (Negative Pressure Wound Therapy) 125 mmHg  -      Dressing (Negative Pressure Wound Therapy) foam, black;other (see comments)   cutimed sorbact contact layer  -      Sponges Inserted (Negative Pressure Wound Therapy) 1;other (see comments)   1 cutimed to cover base and tucked into tunnels, 1 black  -      Sponges Removed (Negative Pressure Wound Therapy) 2   1 white, 1 black  -      Output (mL) 10   scant drainage  -        User Key  (r) = Recorded By, (t) = Taken By, (c) = Cosigned By    Initials Name Provider Type    ANNA Whaley, PT Physical Therapist       Finger sweep and visual inspection performed. Empty wound bed confirmed.  External label applied and verified.        WOUND DEBRIDEMENT  Debridement Site 1  Location- Site 1: LLE wound  Selective Debridement- Site 1: Wound Surface <20cmsq (approx 6cm area debrided)  Instruments- Site 1: tweezers  Excised Tissue Description- Site 1: minimum, slough  Bleeding- Site 1: seeping             Recommendation and Plan        PT Assessment/Plan       04/11/17 1000       PT Assessment    Functional Limitations Impaired gait;Limitation in home management;Limitations in community activities;Performance in leisure activities;Performance in self-care ADL  -     Impairments Integumentary integrity;Edema;Pain  -     Assessment Comments LLE wound with increase in beefy granulation on wound base, also new epithelial growth noted at inferior edge around 5:00.  Wound bed still with min slough buildup and in tunnels.  Pt with slight increase in edema likely due to not wearing compressogrip.  PT changed vac foam to cutimed sorbact contact layer to pack tunnels and provide non-stick antibacterial layer under black foam.  Will continue to assess response.  Pt may be able to transition to  for vac management after next 1-2 weeks if progressing well.  -     Rehab Potential Good  -     Patient/caregiver  participated in establishment of treatment plan and goals Yes  -     Patient would benefit from skilled therapy intervention Yes  -     PT Plan    PT Frequency 3x/week  -     Physical Therapy Interventions (Optional Details) wound care;patient/family education  -     PT Plan Comments VAC, debridement  -       User Key  (r) = Recorded By, (t) = Taken By, (c) = Cosigned By    Initials Name Provider Type    ANNA Whaley, PT Physical Therapist          Goals        PT OP Goals       04/11/17 1000       Time Calculation    PT Goal Re-Cert Due Date 04/29/17  -       User Key  (r) = Recorded By, (t) = Taken By, (c) = Cosigned By    Initials Name Provider Type    ANNA Whaley, PT Physical Therapist          PT Goal Re-Cert Due Date: 04/29/17            Time Calculation: Start Time: 1000    Therapy Charges for Today     Code Description Service Date Service Provider Modifiers Qty    60628711705 HC RUBEN DEBRIDE OPEN WOUND UP TO 20CM 4/11/2017 Lakeshia Whaley, PT 60, GP 1    [id number]  PT NEG PRESS WOUND TO 50SQCM DME1 4/11/2017 Lakeshia Whaley, PT  1    [id number]  PT THER SUPP EA 15 MIN 4/11/2017 Lakeshia Whaley, PT GP 1                Lakeshia Whaley, PT  4/11/2017

## 2017-04-13 ENCOUNTER — HOSPITAL ENCOUNTER (OUTPATIENT)
Dept: PHYSICAL THERAPY | Facility: HOSPITAL | Age: 47
Setting detail: THERAPIES SERIES
Discharge: HOME | End: 2017-04-13

## 2017-04-13 DIAGNOSIS — S81.802D TRAUMATIC OPEN WOUND OF LEFT LOWER LEG WITH DELAYED HEALING, SUBSEQUENT ENCOUNTER: Primary | ICD-10-CM

## 2017-04-13 PROCEDURE — 97605 NEG PRS WND THER DME<=50SQCM: CPT

## 2017-04-13 PROCEDURE — 97597 DBRDMT OPN WND 1ST 20 CM/<: CPT

## 2017-04-13 NOTE — PROGRESS NOTES
"    Outpatient Rehabilitation - Wound/Debridement Treatment Note  [facility]     Patient Name: Sofi Paez  : 1970  MRN: [id number]  Today's Date: 2017            LLE:          Admit Date: 2017    Visit Dx:    ICD-10-CM ICD-9-CM   1. Traumatic open wound of left lower leg with delayed healing, subsequent encounter S81.802D V58.89     891.1       There is no problem list on file for this patient.       Past Medical History:   Diagnosis Date   • COPD (chronic obstructive pulmonary disease)    • High cholesterol    • Rheumatoid arthritis         Past Surgical History:   Procedure Laterality Date   • CHOLECYSTECTOMY     • HYSTERECTOMY     • SKIN GRAFT SPLIT THICKNESS Left 2017    Split-thickness grafting to LLE from L thigh; Ricardo John MD ([facility])   • WOUND DEBRIDEMENT Left 2017    exploration and debridement of leg laceration with surgical closure; per Ricardo John MD   • WOUND DEBRIDEMENT Left 2017    I&D of dehisced surgical wound and surgical closure; Ricardo John MD         EVALUATION        PT Ortho       17 1010    Subjective Comments    Subjective Comments Pt reports no issues with vac pump, but c/o \"I don't like this thing.\"  States she is catching the cord on things, and states the pressure is uncomfortable.  -    Subjective Pain    Able to rate subjective pain? yes  -    Pre-Treatment Pain Level 4  -    Post-Treatment Pain Level 5  -    Transfers    Transfers, Sit-Stand [place] independent  -    Transfers, Stand-Sit [place] independent  -    Transfer, Comment long sitting on stretcher for tx  -    Gait Assessment/Treatment    Gait, [place] Level independent  -      17 1000    Subjective Comments    Subjective Comments Pt states her leg hurt most of the time with the wound vac on.  Accidentally caught the tubing and disconnected it this morning when waking up.  Pt had left vac dressing in place " and covered with an optifoam border dressing until she could get to her appt.  States she has not been wearing compressogrip due to difficulty getting it on while vac in place.  -JM    Subjective Pain    Able to rate subjective pain? yes  -JM    Pre-Treatment Pain Level 3  -JM    Post-Treatment Pain Level 6  -JM    Transfers    Transfers, Sit-Stand [place] independent  -JM    Transfers, Stand-Sit [place] independent  -JM    Transfer, Comment long sitting on stretcher for tx.  -JM    Gait Assessment/Treatment    Gait, [place] Level independent  -      User Key  (r) = Recorded By, (t) = Taken By, (c) = Cosigned By    Initials Name Provider Type    ANNA Whaley, PT Physical Therapist                    LDA Wound       04/13/17 1010          Wound 03/29/17 1030 Left anterior leg laceration    Wound - Properties Group Date first assessed: 03/29/17  - Time first assessed: 1030  -JM Side: Left  - Orientation: anterior  - Location: leg  -JM Type: laceration  -JM Additional Comments: deep laceration to pre-tibial area 1/18/17  -JM    Wound WDL ex   moderate edema, min erythema  -JM      Dressing Appearance intact;moist drainage  -JM      Base moist;pink;granulating;reddened;yellow;slough   fascia and sutures still visible in base but granulating  -JM      Periwound Area swelling;intact;pink;other (see comments);indurated   min induration of periwound at 10:00  -JM      Edges open;rolled   attached 4:00-7:00, loose around remainder  -JM      Length (cm) 5.3  -JM      Width (cm) 6.7  -JM      Depth (cm) 0.5   medial aspect  -JM      Undermining [depth (cm)/Location] 2.0cm/10:00-2:00  -JM      Drainage Characteristics/Odor serosanguineous;yellow;malodorous   mild odor from prev dressing and drainage in canister  -JM      Drainage Amount moderate  -JM      Picture taken yes  -JM      Wound Cleaning cleansed with;other (see comments)   phase one  -      Wound Interventions debrided;other (see  comments)   NPWT  -JM      Dressing other (see comments)   VAC, size 5 compressogrip to LLE  -      Periwound Care cleansed with pH balanced cleanser;dry periwound area maintained;barrier film applied   no-sting and drape to border  -      Therapy Setting (Negative Pressure Wound Therapy) continuous therapy  -      Pressure Setting (Negative Pressure Wound Therapy) 125 mmHg  -JM      Dressing (Negative Pressure Wound Therapy) foam, black;other (see comments)   cutimed sorbact as contact layer and tucked into undermining  -      Sponges Inserted (Negative Pressure Wound Therapy) 1;other (see comments)   1 cutimed, 1 black  -JM      Sponges Removed (Negative Pressure Wound Therapy) 1;other (see comments)   1 cutimed, 1 black  -JM      Output (mL) 50   drainage mixed with ruptured gel packet  -        User Key  (r) = Recorded By, (t) = Taken By, (c) = Cosigned By    Initials Name Provider Type    ANNA Whaley, PT Physical Therapist        Finger sweep and visual inspection performed. Empty wound bed confirmed.  External label applied and verified.      WOUND DEBRIDEMENT  Debridement Site 1  Location- Site 1: LLE wound  Selective Debridement- Site 1: Wound Surface <20cmsq (approx 6cm area debrided)  Instruments- Site 1: tweezers  Excised Tissue Description- Site 1: minimum, slough  Bleeding- Site 1: seeping             Recommendation and Plan        PT Assessment/Plan       04/13/17 1010       PT Assessment    Functional Limitations Impaired gait;Limitation in home management;Limitations in community activities;Performance in leisure activities;Performance in self-care ADL  -     Impairments Integumentary integrity;Edema;Pain  -     Assessment Comments Wound with small decreases in area and undermining.  Wound base 90% granulation and fascia almost completely granulated.  Pt making good progress with NPWT/VAC.  Wound bed still with min biofilm buildup that requires selective debridement.  Pt to  contact PCP re: potential transition to  and also ask about need for antibiotics.  -     Rehab Potential Good  -     Patient/caregiver participated in establishment of treatment plan and goals Yes  -     Patient would benefit from skilled therapy intervention Yes  -     PT Plan    PT Frequency 3x/week  -     Physical Therapy Interventions (Optional Details) patient/family education;wound care  -     PT Plan Comments VAC, debridement  -       User Key  (r) = Recorded By, (t) = Taken By, (c) = Cosigned By    Initials Name Provider Type    ANNA Whaley, PT Physical Therapist          Goals        PT OP Goals       04/13/17 1010       Time Calculation    PT Goal Re-Cert Due Date 04/29/17  -       User Key  (r) = Recorded By, (t) = Taken By, (c) = Cosigned By    Initials Name Provider Type    ANNA Whaley, PT Physical Therapist          PT Goal Re-Cert Due Date: 04/29/17            Time Calculation: Start Time: 1010    Therapy Charges for Today     Code Description Service Date Service Provider Modifiers Qty    29748905483  RUBEN DEBRIDE OPEN WOUND UP TO 20CM 4/13/2017 Lakeshia Whaley, PT 60, GP 1    [id number]  PT NEG PRESS WOUND TO 50SQCM DME1 4/13/2017 Lakeshia Whaley, PT  1    31465098619 HC PT THER SUPP EA 15 MIN 4/13/2017 Lakeshia Whaley, PT GP 1                Lakeshia Whaley, PT  4/13/2017

## 2017-04-15 ENCOUNTER — HOSPITAL ENCOUNTER (OUTPATIENT)
Dept: PHYSICAL THERAPY | Facility: HOSPITAL | Age: 47
Setting detail: THERAPIES SERIES
Discharge: HOME | End: 2017-04-15

## 2017-04-15 DIAGNOSIS — S81.802D TRAUMATIC OPEN WOUND OF LEFT LOWER LEG WITH DELAYED HEALING, SUBSEQUENT ENCOUNTER: Primary | ICD-10-CM

## 2017-04-15 PROCEDURE — 97605 NEG PRS WND THER DME<=50SQCM: CPT

## 2017-04-15 NOTE — PROGRESS NOTES
"    Outpatient Rehabilitation - Wound/Debridement Treatment Note  [facility]     Patient Name: Sofi Paez  : 1970  MRN: [id number]  Today's Date: 4/15/2017                Admit Date: 4/15/2017    Visit Dx:    ICD-10-CM ICD-9-CM   1. Traumatic open wound of left lower leg with delayed healing, subsequent encounter S81.802D V58.89     891.1       There is no problem list on file for this patient.       Past Medical History:   Diagnosis Date   • COPD (chronic obstructive pulmonary disease)    • High cholesterol    • Rheumatoid arthritis         Past Surgical History:   Procedure Laterality Date   • CHOLECYSTECTOMY     • HYSTERECTOMY     • SKIN GRAFT SPLIT THICKNESS Left 2017    Split-thickness grafting to LLE from L thigh; Ricardo John MD ([facility])   • WOUND DEBRIDEMENT Left 2017    exploration and debridement of leg laceration with surgical closure; per Ricardo John MD   • WOUND DEBRIDEMENT Left 2017    I&D of dehisced surgical wound and surgical closure; Ricardo John MD         EVALUATION        PT Ortho       04/15/17 1100    Subjective Comments    Subjective Comments No complaints or concerns since last visit. Pt is going to ask her doctor about switching to home health to manage the wound vac, as she lives in [facility].  -MC    Subjective Pain    Able to rate subjective pain? yes  -MC    Pre-Treatment Pain Level 3  -MC    Post-Treatment Pain Level 4  -MC    Transfers    Transfers, Sit-Stand [place] independent  -    Transfers, Stand-Sit [place] independent  -    Transfer, Comment long sitting on stretcher for tx  -    Gait Assessment/Treatment    Gait, [place] Level independent  -      17 1010    Subjective Comments    Subjective Comments Pt reports no issues with vac pump, but c/o \"I don't like this thing.\"  States she is catching the cord on things, and states the pressure is uncomfortable.  -    Subjective Pain    Able " to rate subjective pain? yes  -    Pre-Treatment Pain Level 4  -JM    Post-Treatment Pain Level 5  -JM    Transfers    Transfers, Sit-Stand [place] independent  -    Transfers, Stand-Sit [place] independent  -    Transfer, Comment long sitting on stretcher for tx  -JM    Gait Assessment/Treatment    Gait, [place] Level independent  -      User Key  (r) = Recorded By, (t) = Taken By, (c) = Cosigned By    Initials Name Provider Type     Shira Garcia, PT Physical Therapist    ANNA Whaley, PT Physical Therapist                    [facility] Wound       04/15/17 1100          Wound 03/29/17 1030 Left anterior leg laceration    Wound - Properties Group Date first assessed: 03/29/17  - Time first assessed: 1030  -JM Side: Left  - Orientation: anterior  - Location: leg  - Type: laceration  - Additional Comments: deep laceration to pre-tibial area 1/18/17  -    Wound WDL ex   moderate edema, min erythema  -      Dressing Appearance intact;moist drainage  -      Base moist;pink;granulating;reddened;yellow;slough   fascia and sutures visible, majority granulated  -      Periwound Area swelling;intact;pink;other (see comments);indurated   min induration of periwound at 10:00  -      Edges open;rolled   attached 4:00-7:00, loose around remainder  -      Drainage Characteristics/Odor serosanguineous;yellow;malodorous   mild odor from prev dressing and drainage in canister  -      Drainage Amount small  -      Picture taken yes  -      Wound Cleaning cleansed with;other (see comments)   phase one  -      Wound Interventions other (see comments)   NPWT  -      Dressing other (see comments)   NPWT, size 5 compressogrip to LLE  -      Periwound Care cleansed with pH balanced cleanser;barrier film applied;other (see comments)   NoSting, drape border  -      Therapy Setting (Negative Pressure Wound Therapy) continuous therapy  -      Pressure Setting (Negative  Pressure Wound Therapy) 125 mmHg  -      Dressing (Negative Pressure Wound Therapy) foam, black;other (see comments)   cutimed sorbact as contact layer, tucked under edges  -      Sponges Inserted (Negative Pressure Wound Therapy) 1;other (see comments)   1 cutimed sorbact, 1 black  -      Sponges Removed (Negative Pressure Wound Therapy) 1;other (see comments)   1 black, 1 cutimed sorbact  -      Output (mL) 150   mostly solid d/t gel pack  -        User Key  (r) = Recorded By, (t) = Taken By, (c) = Cosigned By    Initials Name Provider Type     Shira Garcia, PT Physical Therapist    ANNA Whaley, PT Physical Therapist         Finger sweep and visual inspection performed. Empty wound bed confirmed.  External label applied and verified.      WOUND DEBRIDEMENT                Recommendation and Plan        PT Assessment/Plan       04/15/17 1100       PT Assessment    Functional Limitations Impaired gait;Limitation in home management;Limitations in community activities;Performance in leisure activities;Performance in self-care ADL  -     Impairments Integumentary integrity;Edema;Pain  -     Assessment Comments Wound bed continues to granulate with very little fascia visible. Scant biofilm washed away with Phase One cleaning today. Wound appears to continue to improve. Pt to ask MD about potential switch to  to manage wound vac.  -     Rehab Potential Good  -     Patient/caregiver participated in establishment of treatment plan and goals Yes  -     Patient would benefit from skilled therapy intervention Yes  -MC     PT Plan    PT Frequency 3x/week  -     Predicted Duration of Therapy Intervention (days/wks) 12 weeks  -     Physical Therapy Interventions (Optional Details) patient/family education;wound care  -       User Key  (r) = Recorded By, (t) = Taken By, (c) = Cosigned By    Initials Name Provider Type    LAUREN Garcia, PT Physical Therapist          Goals         PT OP Goals       04/15/17 1100       Time Calculation    PT Goal Re-Cert Due Date 04/29/17  -       User Key  (r) = Recorded By, (t) = Taken By, (c) = Cosigned By    Initials Name Provider Type     Shira Garcia, PT Physical Therapist          PT Goal Re-Cert Due Date: 04/29/17            Time Calculation: Start Time: 1100    Therapy Charges for Today     Code Description Service Date Service Provider Modifiers Qty    96914732459 HC PT NEG PRESS WOUND TO 50SQCM DME1 4/15/2017 Shira Garcia, PT  1                Shira Garcia, PT  4/15/2017

## 2017-04-18 ENCOUNTER — HOSPITAL ENCOUNTER (OUTPATIENT)
Dept: PHYSICAL THERAPY | Facility: HOSPITAL | Age: 47
Setting detail: THERAPIES SERIES
Discharge: HOME | End: 2017-04-18

## 2017-04-18 DIAGNOSIS — S81.802D TRAUMATIC OPEN WOUND OF LEFT LOWER LEG WITH DELAYED HEALING, SUBSEQUENT ENCOUNTER: Primary | ICD-10-CM

## 2017-04-18 PROCEDURE — 97605 NEG PRS WND THER DME<=50SQCM: CPT

## 2017-04-18 PROCEDURE — 97597 DBRDMT OPN WND 1ST 20 CM/<: CPT

## 2017-04-18 NOTE — PROGRESS NOTES
Outpatient Rehabilitation - Wound/Debridement Treatment Note  [facility]     Patient Name: Sofi Paez  : 1970  MRN: [id number]  Today's Date: 2017            LLE:      Admit Date: 2017    Visit Dx:    ICD-10-CM ICD-9-CM   1. Traumatic open wound of left lower leg with delayed healing, subsequent encounter S81.802D V58.89     891.1       There is no problem list on file for this patient.       Past Medical History:   Diagnosis Date   • COPD (chronic obstructive pulmonary disease)    • High cholesterol    • Rheumatoid arthritis         Past Surgical History:   Procedure Laterality Date   • CHOLECYSTECTOMY     • HYSTERECTOMY     • SKIN GRAFT SPLIT THICKNESS Left 2017    Split-thickness grafting to LLE from L thigh; Ricardo John MD ([facility])   • WOUND DEBRIDEMENT Left 2017    exploration and debridement of leg laceration with surgical closure; per Ricardo John MD   • WOUND DEBRIDEMENT Left 2017    I&D of dehisced surgical wound and surgical closure; Ricardo John MD         EVALUATION        PT Ortho       17 1100    Subjective Comments    Subjective Comments Pt states the local  agency is wanting to talk to PT about her treatment.  Pt will call them and give Rehab dept number so they can call and talk to PT.  Otherwise no complaints, no issues with vac pump since last tx.  -    Subjective Pain    Able to rate subjective pain? yes  -    Pre-Treatment Pain Level 3  -    Post-Treatment Pain Level 4  -    Transfers    Transfers, Sit-Stand [place] independent  -    Transfers, Stand-Sit [place] independent  -    Transfer, Comment Long sitting on stretcher for tx.  -    Gait Assessment/Treatment    Gait, [place] Level independent  -      User Key  (r) = Recorded By, (t) = Taken By, (c) = Cosigned By    Initials Name Provider Type    ANNA Whaley, PT Physical Therapist                    LDA Wound        04/18/17 1100          Wound 03/29/17 1030 Left anterior leg laceration    Wound - Properties Group Date first assessed: 03/29/17  - Time first assessed: 1030  -JM Side: Left  - Orientation: anterior  - Location: leg  -JM Type: laceration  - Additional Comments: deep laceration to pre-tibial area 1/18/17  -    Wound WDL ex   moderate edema  -      Dressing Appearance intact;moist drainage  -      Base moist;pink;granulating;reddened;yellow;slough   suture still visible in medial wound bed, others granulated  -JM      Periwound Area swelling;intact;pink;other (see comments);indurated   min induration of periwound at 10:00  -JM      Edges open;rolled   attached 4:00-7:00, loose around remainder  -JM      Length (cm) 5  -JM      Width (cm) 6.5  -JM      Depth (cm) 0.5   medial aspect  -JM      Undermining [depth (cm)/Location] 0.5cm@2:00  -JM      Drainage Characteristics/Odor serosanguineous;yellow;malodorous   mild odor from prev dressing and drainage in canister  -JM      Drainage Amount small  -JM      Picture taken yes  -JM      Wound Cleaning cleansed with;other (see comments)   Phase One  -      Wound Interventions debrided;other (see comments)   NPWT  -JM      Periwound Care cleansed with pH balanced cleanser;dry periwound area maintained;barrier film applied   no-sting spray, drape to border  -JM      Therapy Setting (Negative Pressure Wound Therapy) continuous therapy  -      Pressure Setting (Negative Pressure Wound Therapy) 125 mmHg  -JM      Dressing (Negative Pressure Wound Therapy) foam, black;other (see comments)   cutimed sorbact as contact layer  -      Sponges Inserted (Negative Pressure Wound Therapy) 1;other (see comments)   1 black, 1 cutimed  -JM      Sponges Removed (Negative Pressure Wound Therapy) 1;other (see comments)   1 black, 1 cutimed  -JM      Output (mL) 150   canister changed today  -        User Key  (r) = Recorded By, (t) = Taken By, (c) = Cosigned By     Initials Name Provider Type    ANNA Whaley, PT Physical Therapist         Finger sweep and visual inspection performed. Empty wound bed confirmed.  External label applied and verified.      WOUND DEBRIDEMENT  Debridement Site 1  Location- Site 1: LLE wound  Selective Debridement- Site 1: Wound Surface <20cmsq (approx 6cm area debrided, medial/lat aspects)  Instruments- Site 1: tweezers  Excised Tissue Description- Site 1: minimum, slough  Bleeding- Site 1: seeping             Recommendation and Plan        PT Assessment/Plan       04/18/17 1100       PT Assessment    Functional Limitations Impaired gait;Limitation in home management;Limitations in community activities;Performance in leisure activities;Performance in self-care ADL  -     Impairments Integumentary integrity;Edema;Pain  -     Assessment Comments Wound area with decrease in length 0.5cm, also decrease in undermining to 0.5cm.  Wound base beefy red granulation tissue.  Pt should be appropriate to transition to  for ongoing management due to undue hardship to come for OP tx (2 hr drive, pt unable to drive and is relying on family members for transportation.)  -     Rehab Potential Good  -     Patient/caregiver participated in establishment of treatment plan and goals Yes  -     Patient would benefit from skilled therapy intervention Yes  -     PT Plan    PT Frequency 3x/week  -     Physical Therapy Interventions (Optional Details) patient/family education;wound care  -     PT Plan Comments VAC, debridement until HH initiated   -       User Key  (r) = Recorded By, (t) = Taken By, (c) = Cosigned By    Initials Name Provider Type    ANNA Whaley, PT Physical Therapist          Goals        PT OP Goals       04/18/17 1100       Time Calculation    PT Goal Re-Cert Due Date 04/29/17  -       User Key  (r) = Recorded By, (t) = Taken By, (c) = Cosigned By    Initials Name Provider Type    ANNA Whaley, PT Physical  Therapist          PT Goal Re-Cert Due Date: 04/29/17            Time Calculation: Start Time: 1100    Therapy Charges for Today     Code Description Service Date Service Provider Modifiers Qty    14986786094 HC PT NEG PRESS WOUND TO 50SQCM DME1 4/18/2017 Lakeshia Whaley, PT  1    57841689262 HC RUBEN DEBRIDE OPEN WOUND UP TO 20CM 4/18/2017 Lakeshia Whaley, PT 60, GP 1    [id number] HC PT THER SUPP EA 15 MIN 4/18/2017 Lakeshia Whaley, PT GP 1                Lakeshia Whaley, PT  4/18/2017

## 2017-04-21 ENCOUNTER — DOCUMENTATION (OUTPATIENT)
Dept: PHYSICAL THERAPY | Facility: HOSPITAL | Age: 47
End: 2017-04-21

## 2017-04-21 PROCEDURE — G8992 OTHER PT/OT  D/C STATUS: HCPCS

## 2017-04-21 PROCEDURE — G8991 OTHER PT/OT GOAL STATUS: HCPCS

## 2017-04-21 NOTE — THERAPY DISCHARGE NOTE
Outpatient Rehabilitation - Wound/Debridement Discharge Summary       Patient Name: Sofi Paez  : 1970  MRN: [id number]  Today's Date: 2017                Admit Date: (Not on file)    Visit Dx:  No diagnosis found.    There is no problem list on file for this patient.       Past Medical History:   Diagnosis Date   • COPD (chronic obstructive pulmonary disease)    • High cholesterol    • Rheumatoid arthritis         Past Surgical History:   Procedure Laterality Date   • CHOLECYSTECTOMY     • HYSTERECTOMY     • SKIN GRAFT SPLIT THICKNESS Left 2017    Split-thickness grafting to LLE from L thigh; Ricardo John MD ([facility])   • WOUND DEBRIDEMENT Left 2017    exploration and debridement of leg laceration with surgical closure; per Ricardo John MD   • WOUND DEBRIDEMENT Left 2017    I&D of dehisced surgical wound and surgical closure; Ricardo John MD         EVALUATION                LDA Wound                  Wound 17 1030 Left anterior leg laceration    Wound - Properties Group Date first assessed: 17  - Time first assessed: 1030  -JM Side: Left  - Orientation: anterior  -JM Location: leg  -JM Type: laceration  -JM Additional Comments: deep laceration to pre-tibial area 17  -      User Key  (r) = Recorded By, (t) = Taken By, (c) = Cosigned By    Initials Name Provider Type    ANNA Whaley, PT Physical Therapist            WOUND DEBRIDEMENT                     Recommendation and Plan        PT Assessment/Plan       17 4763       PT Assessment    Assessment Comments Pt cancelled her appt for 17 and all future appts secondary to having home health set up to manage NPWT.   -     PT Plan    PT Plan Comments D/C PT wound care  -       User Key  (r) = Recorded By, (t) = Taken By, (c) = Cosigned By    Initials Name Provider Type    LAUREN Garcia, PT Physical Therapist          Goals        PT OP Goals        04/21/17 1558       PT Short Term Goals    STG 1 Patient and/ or caregiver able to verbalize signs and symptoms of infection.  -     STG 1 Progress Not Met  -     STG 2 Decrease wound dimensions by 25% as evidence of wound closure.  -     STG 2 Progress Not Met  -     STG 3 Increase beefy red granulation of wound to 50% of wound base.  -     STG 3 Progress Met   based on most recent wound photos  -     Long Term Goals    LTG 1 Patient and/ or caregiver independent with clean dressing changes.  -MC     LTG 1 Progress Not Met  -MC     LTG 2 Decrease wound dimensions by 75% as evidence of wound closure.  -MC     LTG 2 Progress Not Met  -MC     LTG 3 Wound base 100% granulation to promote re-epithelialization of wound.  -     LTG 3 Progress Not Met  -       User Key  (r) = Recorded By, (t) = Taken By, (c) = Cosigned By    Initials Name Provider Type    LAUREN Garcia, PT Physical Therapist          Time Calculation:          PT G-Codes  PT Professional Judgement Used?: Yes (based on initial eval)  Functional Limitation: Other PT primary  Other PT Primary Goal Status (): At least 1 percent but less than 20 percent impaired, limited or restricted  Other PT Primary Discharge Status (): At least 60 percent but less than 80 percent impaired, limited or restricted           OP Discharge Summary       04/21/17 1600          OP PT Discharge Summary    Date of Discharge 04/21/17  -      Reason for Discharge Patient/Caregiver request   Pt set up HH to manage NPWT d/t transportation difficulties.  -      Outcomes Achieved Patient able to partially acheive established goals   Not able to assess most goals for status, assume not met.  -      Discharge Destination Home health services  -        User Key  (r) = Recorded By, (t) = Taken By, (c) = Cosigned By    Initials Name Provider Type    LAUREN Garcia, PT Physical Therapist          Shira Garcia, PT  4/21/2017